# Patient Record
Sex: FEMALE | Race: WHITE | URBAN - METROPOLITAN AREA
[De-identification: names, ages, dates, MRNs, and addresses within clinical notes are randomized per-mention and may not be internally consistent; named-entity substitution may affect disease eponyms.]

---

## 2017-01-25 DIAGNOSIS — B18.2 CHRONIC HEPATITIS C WITHOUT HEPATIC COMA (H): Primary | ICD-10-CM

## 2017-01-27 ENCOUNTER — OFFICE VISIT (OUTPATIENT)
Dept: GASTROENTEROLOGY | Facility: CLINIC | Age: 57
End: 2017-01-27
Attending: PHYSICIAN ASSISTANT
Payer: COMMERCIAL

## 2017-01-27 VITALS
BODY MASS INDEX: 25.36 KG/M2 | WEIGHT: 161.6 LBS | OXYGEN SATURATION: 96 % | DIASTOLIC BLOOD PRESSURE: 73 MMHG | SYSTOLIC BLOOD PRESSURE: 119 MMHG | HEIGHT: 67 IN | HEART RATE: 92 BPM | TEMPERATURE: 97.7 F

## 2017-01-27 DIAGNOSIS — B18.2 CHRONIC HEPATITIS C WITHOUT HEPATIC COMA (H): ICD-10-CM

## 2017-01-27 DIAGNOSIS — B18.2 CHRONIC HEPATITIS C WITHOUT HEPATIC COMA (H): Primary | ICD-10-CM

## 2017-01-27 PROBLEM — E78.2 MIXED HYPERLIPIDEMIA: Status: ACTIVE | Noted: 2017-01-27

## 2017-01-27 PROBLEM — F41.9 ANXIETY: Status: ACTIVE | Noted: 2017-01-27

## 2017-01-27 LAB
ALBUMIN SERPL-MCNC: 4 G/DL (ref 3.4–5)
ALP SERPL-CCNC: 70 U/L (ref 40–150)
ALT SERPL W P-5'-P-CCNC: 45 U/L (ref 0–50)
ANION GAP SERPL CALCULATED.3IONS-SCNC: 10 MMOL/L (ref 3–14)
AST SERPL W P-5'-P-CCNC: 41 U/L (ref 0–45)
BILIRUB DIRECT SERPL-MCNC: 0.1 MG/DL (ref 0–0.2)
BILIRUB SERPL-MCNC: 0.6 MG/DL (ref 0.2–1.3)
BUN SERPL-MCNC: 17 MG/DL (ref 7–30)
CALCIUM SERPL-MCNC: 9.1 MG/DL (ref 8.5–10.1)
CHLORIDE SERPL-SCNC: 107 MMOL/L (ref 94–109)
CO2 SERPL-SCNC: 23 MMOL/L (ref 20–32)
CREAT SERPL-MCNC: 0.73 MG/DL (ref 0.52–1.04)
ERYTHROCYTE [DISTWIDTH] IN BLOOD BY AUTOMATED COUNT: 11.7 % (ref 10–15)
GFR SERPL CREATININE-BSD FRML MDRD: 83 ML/MIN/1.7M2
GLUCOSE SERPL-MCNC: 94 MG/DL (ref 70–99)
HAV IGG SER QL IA: NORMAL
HBV SURFACE AB SERPL IA-ACNC: 0.79 M[IU]/ML
HBV SURFACE AG SERPL QL IA: NONREACTIVE
HCT VFR BLD AUTO: 40.9 % (ref 35–47)
HGB BLD-MCNC: 14.3 G/DL (ref 11.7–15.7)
INR PPP: 0.99 (ref 0.86–1.14)
IRON SATN MFR SERPL: 42 % (ref 15–46)
IRON SERPL-MCNC: 134 UG/DL (ref 35–180)
MCH RBC QN AUTO: 33.5 PG (ref 26.5–33)
MCHC RBC AUTO-ENTMCNC: 35 G/DL (ref 31.5–36.5)
MCV RBC AUTO: 96 FL (ref 78–100)
PLATELET # BLD AUTO: 215 10E9/L (ref 150–450)
POTASSIUM SERPL-SCNC: 3.8 MMOL/L (ref 3.4–5.3)
PROT SERPL-MCNC: 7.8 G/DL (ref 6.8–8.8)
RBC # BLD AUTO: 4.27 10E12/L (ref 3.8–5.2)
SODIUM SERPL-SCNC: 140 MMOL/L (ref 133–144)
TIBC SERPL-MCNC: 321 UG/DL (ref 240–430)
WBC # BLD AUTO: 4.7 10E9/L (ref 4–11)

## 2017-01-27 PROCEDURE — 87902 NFCT AGT GNTYP ALYS HEP C: CPT | Performed by: PHYSICIAN ASSISTANT

## 2017-01-27 PROCEDURE — 80076 HEPATIC FUNCTION PANEL: CPT | Performed by: PHYSICIAN ASSISTANT

## 2017-01-27 PROCEDURE — 80048 BASIC METABOLIC PNL TOTAL CA: CPT | Performed by: PHYSICIAN ASSISTANT

## 2017-01-27 PROCEDURE — 83540 ASSAY OF IRON: CPT | Performed by: PHYSICIAN ASSISTANT

## 2017-01-27 PROCEDURE — 85610 PROTHROMBIN TIME: CPT | Performed by: PHYSICIAN ASSISTANT

## 2017-01-27 PROCEDURE — 87340 HEPATITIS B SURFACE AG IA: CPT | Performed by: PHYSICIAN ASSISTANT

## 2017-01-27 PROCEDURE — 85027 COMPLETE CBC AUTOMATED: CPT | Performed by: PHYSICIAN ASSISTANT

## 2017-01-27 PROCEDURE — 86704 HEP B CORE ANTIBODY TOTAL: CPT | Performed by: PHYSICIAN ASSISTANT

## 2017-01-27 PROCEDURE — 86708 HEPATITIS A ANTIBODY: CPT | Performed by: PHYSICIAN ASSISTANT

## 2017-01-27 PROCEDURE — 86706 HEP B SURFACE ANTIBODY: CPT | Performed by: PHYSICIAN ASSISTANT

## 2017-01-27 PROCEDURE — 36415 COLL VENOUS BLD VENIPUNCTURE: CPT | Performed by: PHYSICIAN ASSISTANT

## 2017-01-27 PROCEDURE — 99212 OFFICE O/P EST SF 10 MIN: CPT | Mod: ZF

## 2017-01-27 PROCEDURE — 83550 IRON BINDING TEST: CPT | Performed by: PHYSICIAN ASSISTANT

## 2017-01-27 PROCEDURE — 87522 HEPATITIS C REVRS TRNSCRPJ: CPT | Performed by: PHYSICIAN ASSISTANT

## 2017-01-27 RX ORDER — TRAZODONE HYDROCHLORIDE 50 MG/1
TABLET, FILM COATED ORAL
Refills: 0 | COMMUNITY
Start: 2017-01-13

## 2017-01-27 RX ORDER — METHOCARBAMOL 500 MG/1
TABLET, FILM COATED ORAL
COMMUNITY
Start: 2016-08-03

## 2017-01-27 ASSESSMENT — PAIN SCALES - GENERAL: PAINLEVEL: NO PAIN (0)

## 2017-01-27 NOTE — NURSING NOTE
Chief Complaint   Patient presents with     Consult     Hepatitis C   Pt roomed, vitals, meds, and allergies reviewed with pt. Pt ready for provider.  Theodore Alcantar, CMA

## 2017-01-27 NOTE — PROGRESS NOTES
Division of Gastroenterology, Hepatology and Nutrition Department of Medicine       Assessment/Plan:  Chronic Hepatitis C,  genotype pending         Lashon Leggett  has chronic hepatitis C.  She has normal liver synthetic function labs including PLTs of 215.  There is no stigmata of chronic liver disease.  Her HCV genotype, quant,  and labs for Hepatitis A and B serology are pending.  The iron saturation is 42%.  She  will schedule a Fibrosis Scan and US.   I will see the patient afterwards to discuss the results and start the Hepatitis C treatment process.     We did discuss the effects of alcohol and hepatitis C on the liver.      Thank you for allowing me to participate in the care of this patient.  If you have any questions regarding this visit and plan of care, please do not hesitate to page me at [phone].        Senia Santillan MS, PA-C  Division of Gastroenterology, Hepatology and Nutrition      HPI:    Lashon Leggett is a pleasant 56 year old  female  who presents for consultation regarding chronic hepatitis C.  She is genotype unknown. She was diagnosed just one week ago.  Her risk factors for hepatitis C are: 22 years ago had a blood transfusion. She denies any IV or intranasal drugs.    She does drink every moderately every few weeks at dinner.  She has no history of alcohol abuse.      She  is  treatment naive. This patient also has the following significant past medical history:   Patient Active Problem List   Diagnosis     CARDIOVASCULAR SCREENING; LDL GOAL LESS THAN 160     Chronic hepatitis C without hepatic coma (H)     Anxiety     Mixed hyperlipidemia   .    The patient does not have ascites or any overt signs of hepatic encephalopathy.  She  denies any hematemesis, hematochezia or melena.  She is negative for HIV, DM, inherited coagulation disorders, cryoglobulinemia, CAD, chronic skin conditions.  She did quit smoking a year ago.    There is no family history of liver  "disease.    The patient is  and has 2 adult daughters.  She has recently adopted a 3 year old.  She lives in [place] about 1-1/2 hours away.    ROS:  10 point review of systems performed.  All pertinent positives noted in the HPI,  otherwise Negative.      History reviewed. No pertinent past medical history.    Current Outpatient Prescriptions   Medication Sig Dispense Refill     naproxen (NAPROSYN) 375 MG tablet Take 375 mg by mouth       traZODone (DESYREL) 50 MG tablet   0     methocarbamol (ROBAXIN) 500 MG tablet        ALPRAZOLAM PO Take 0.25 mg by mouth 2 times daily as needed for anxiety         No Known Allergies    History reviewed. No pertinent family history.    Social History     Social History     Marital Status:      Spouse Name: N/A     Number of Children: N/A     Years of Education: N/A     Social History Main Topics     Smoking status: Former Smoker     Smokeless tobacco: Never Used     Alcohol Use: No      Comment: on occ     Drug Use: No     Sexual Activity: Not Currently     Other Topics Concern     None     Social History Narrative    ** Merged History Encounter **            OBJECTIVE  Vitals: Blood pressure 119/73, pulse 92, temperature 97.7  F (36.5  C), temperature source Oral, height 1.702 m (5' 7\"), weight 73.301 kg (161 lb 9.6 oz), SpO2 96 %. There is no weight on file to calculate BMI.  Gen: No acute distress, well nourished  Head: Normocephalic atraumatic  Eyes: Sclera anicteric  Respiratory: Clear to auscultation bilaterally, no overt wheezing or rales  CV: RRR without overt murmur  Abdomen: Soft, nontender, nondistended, normal bowel sounds  Without appreciable hepatosplenomegaly or masses  Extrem: No edema  Skin: No jaundice, spider angiomata or palmar erythema.  No rashes.   Neuro: Alert and oriented.   MSK: Grossly Intact  Psych: Normal speech, normal affect    Recent Laboratory Test Results  Lab Results   Component Value Date    ALBUMIN 4.0 01/27/2017    BILITOTAL " 0.6 01/27/2017    ALKPHOS 70 01/27/2017    AST 41 01/27/2017    ALT 45 01/27/2017    WBC 4.7 01/27/2017    ANEU 5.8 05/05/2015    HGB 14.3 01/27/2017     01/27/2017    INR 0.99 01/27/2017    CR 0.73 01/27/2017

## 2017-01-27 NOTE — MR AVS SNAPSHOT
After Visit Summary   1/27/2017    Lashon Leggett    MRN: [id number]           Patient Information     Date Of Birth          1960        Visit Information        Provider Department      1/27/2017 10:30 AM Senia Santillan PA-C [facility]        Today's Diagnoses     Chronic hepatitis C without hepatic coma (H)    -  1        Follow-ups after your visit        Your next 10 appointments already scheduled     Feb 17, 2017 11:30 AM   US ABDOMEN COMPLETE with [email or web address]   [facility] US ([facility])    [address] [id number] [phone]           Please bring a list of your medicines (including vitamins, minerals and over-the-counter drugs). Also, tell your doctor about any allergies you may have. Wear comfortable clothes and leave your valuables at home.  Adults: No eating or drinking for 8 hours before the exam. You may take medicine with a small sip of water.  Children: - Children 6+ years: No food or drink for 6 hours before exam. - Children 1-5 years: No food or drink for 4 hours before exam. - Infants, breast-fed: may have breast milk up to 2 hours before exam. - Infants, formula: may have bottle until 4 hours before exam.  Please call the Imaging Department at your exam site with any questions.            Feb 17, 2017  1:00 PM   (Arrive by 12:45 PM)   FIBROSIS SCAN with  FIBROSIS SCAN   [facility] ([facility])    [address] [place] [id number] [phone]            Feb 17, 2017  1:30 PM   (Arrive by 1:15 PM)   Return General Liver with LILLIAN [facility] ([facility])    [address] [place] [id number] [phone]              Future tests that were ordered for you today     Open Future Orders        Priority Expected Expires Ordered    Fibrosis  "Scan (In-Clinic) Routine  2018    US Abdomen Complete Routine  2018            Who to contact     If you have questions or need follow up information about today's clinic visit or your schedule please contact [facility]PAT[facility] directly at [phone].  Normal or non-critical lab and imaging results will be communicated to you by MyChart, letter or phone within 4 business days after the clinic has received the results. If you do not hear from us within 7 days, please contact the clinic through [facility] or phone. If you have a critical or abnormal lab result, we will notify you by phone as soon as possible.  Submit refill requests through [facility] or call your pharmacy and they will forward the refill request to us. Please allow 3 business days for your refill to be completed.          Additional Information About Your Visit        [employer] Information     [facility] lets you send messages to your doctor, view your test results, renew your prescriptions, schedule appointments and more. To sign up, go to [email or web address] . Click on \"Log in\" on the left side of the screen, which will take you to the Welcome page. Then click on \"Sign up Now\" on the right side of the page.     You will be asked to enter the access code listed below, as well as some personal information. Please follow the directions to create your username and password.     Your access code is: [email or web address]  Expires: 2017  6:30 AM     Your access code will  in 90 days. If you need help or a new code, please call your [facility] clinic or [phone].        Care EveryWhere ID     This is your Care EveryWhere ID. This could be used by other organizations to access your [facility] medical records  [id number]        Your Vitals Were     Pulse Temperature Height BMI (Body Mass Index) Pulse Oximetry       92 97.7  F (36.5  C) (Oral) 1.702 m (5' 7\") 25.30 kg/m2 96%        Blood Pressure from Last 3 Encounters: "   01/27/17 119/73   05/05/15 112/78   07/30/09 100/70    Weight from Last 3 Encounters:   01/27/17 73.301 kg (161 lb 9.6 oz)   05/05/15 71.215 kg (157 lb)   07/30/09 64.592 kg (142 lb 6.4 oz)               Primary Care Provider Office Phone # Fax #    Melanie AlemanIAN [facility] [phone]       [place] [address] [place] [address]        Thank you!     Thank you for choosing [facility]  for your care. Our goal is always to provide you with excellent care. Hearing back from our patients is one way we can continue to improve our services. Please take a few minutes to complete the written survey that you may receive in the mail after your visit with us. Thank you!             Your Updated Medication List - Protect others around you: Learn how to safely use, store and throw away your medicines at www.disposemymeds.org.          This list is accurate as of: 1/27/17 11:07 AM.  Always use your most recent med list.                   Brand Name Dispense Instructions for use    ALPRAZOLAM PO      Take 0.25 mg by mouth 2 times daily as needed for anxiety       methocarbamol 500 MG tablet    ROBAXIN         NAPROSYN 375 MG tablet   Generic drug:  naproxen      Take 375 mg by mouth       traZODone 50 MG tablet    DESYREL

## 2017-01-27 NOTE — Clinical Note
1/27/2017       RE: Lashon Leggett  [address] [facility] [address]     Dear Colleague,    Thank you for referring your patient, Lashon Leggett, to the [facility]PAT[facility] at [facility]. Please see a copy of my visit note below.    Division of Gastroenterology, Hepatology and Nutrition Department of Medicine       Assessment/Plan:  Chronic Hepatitis C,  genotype pending         Lashon Leggett  has chronic hepatitis C.  She has normal liver synthetic function labs including PLTs of 215.  There is no stigmata of chronic liver disease.  Her HCV genotype, quant,  and labs for Hepatitis A and B serology are pending.  The iron saturation is 42%.  She  will schedule a Fibrosis Scan and US.   I will see the patient afterwards to discuss the results and start the Hepatitis C treatment process.     We did discuss the effects of alcohol and hepatitis C on the liver.      Thank you for allowing me to participate in the care of this patient.  If you have any questions regarding this visit and plan of care, please do not hesitate to page me at [phone].        Senia Santillan MS, PA-C  Division of Gastroenterology, Hepatology and Nutrition      HPI:    Lashon Leggett is a pleasant 56 year old  female  who presents for consultation regarding chronic hepatitis C.  She is genotype unknown. She was diagnosed just one week ago.  Her risk factors for hepatitis C are: 22 years ago had a blood transfusion. She denies any IV or intranasal drugs.    She does drink every moderately every few weeks at dinner.  She has no history of alcohol abuse.      She  is  treatment naive. This patient also has the following significant past medical history:   Patient Active Problem List   Diagnosis     CARDIOVASCULAR SCREENING; LDL GOAL LESS THAN 160     Chronic hepatitis C without hepatic coma (H)     Anxiety     Mixed hyperlipidemia   .    The patient does not have ascites or any overt signs of  "hepatic encephalopathy.  She  denies any hematemesis, hematochezia or melena.  She is negative for HIV, DM, inherited coagulation disorders, cryoglobulinemia, CAD, chronic skin conditions.  She did quit smoking a year ago.    There is no family history of liver disease.    The patient is  and has 2 adult daughters.  She has recently adopted a 3 year old.  She lives in [place] about 1-1/2 hours away.    ROS:  10 point review of systems performed.  All pertinent positives noted in the HPI,  otherwise Negative.      History reviewed. No pertinent past medical history.    Current Outpatient Prescriptions   Medication Sig Dispense Refill     naproxen (NAPROSYN) 375 MG tablet Take 375 mg by mouth       traZODone (DESYREL) 50 MG tablet   0     methocarbamol (ROBAXIN) 500 MG tablet        ALPRAZOLAM PO Take 0.25 mg by mouth 2 times daily as needed for anxiety         No Known Allergies    History reviewed. No pertinent family history.    Social History     Social History     Marital Status:      Spouse Name: N/A     Number of Children: N/A     Years of Education: N/A     Social History Main Topics     Smoking status: Former Smoker     Smokeless tobacco: Never Used     Alcohol Use: No      Comment: on occ     Drug Use: No     Sexual Activity: Not Currently     Other Topics Concern     None     Social History Narrative    ** Merged History Encounter **            OBJECTIVE  Vitals: Blood pressure 119/73, pulse 92, temperature 97.7  F (36.5  C), temperature source Oral, height 1.702 m (5' 7\"), weight 73.301 kg (161 lb 9.6 oz), SpO2 96 %. There is no weight on file to calculate BMI.  Gen: No acute distress, well nourished  Head: Normocephalic atraumatic  Eyes: Sclera anicteric  Respiratory: Clear to auscultation bilaterally, no overt wheezing or rales  CV: RRR without overt murmur  Abdomen: Soft, nontender, nondistended, normal bowel sounds  Without appreciable hepatosplenomegaly or masses  Extrem: No " edema  Skin: No jaundice, spider angiomata or palmar erythema.  No rashes.   Neuro: Alert and oriented.   MSK: Grossly Intact  Psych: Normal speech, normal affect    Recent Laboratory Test Results  Lab Results   Component Value Date    ALBUMIN 4.0 01/27/2017    BILITOTAL 0.6 01/27/2017    ALKPHOS 70 01/27/2017    AST 41 01/27/2017    ALT 45 01/27/2017    WBC 4.7 01/27/2017    ANEU 5.8 05/05/2015    HGB 14.3 01/27/2017     01/27/2017    INR 0.99 01/27/2017    CR 0.73 01/27/2017       Again, thank you for allowing me to participate in the care of your patient.      Sincerely,    Senia Santillan PA-C

## 2017-01-30 LAB
HBV CORE AB SERPL QL IA: NONREACTIVE
HCV RNA SERPL NAA+PROBE-ACNC: ABNORMAL [IU]/ML
HCV RNA SERPL NAA+PROBE-LOG IU: 4.6 LOG IU/ML

## 2017-02-02 LAB — HCV GENTYP SERPL NAA+PROBE: NORMAL

## 2017-02-03 NOTE — PROGRESS NOTES
Pt is currently seeing Elvira [facility] out of [facility].  She will contact the [facility]

## 2017-02-06 ENCOUNTER — TELEPHONE (OUTPATIENT)
Dept: GASTROENTEROLOGY | Facility: CLINIC | Age: 57
End: 2017-02-06

## 2017-02-06 DIAGNOSIS — B18.2 CHRONIC HEPATITIS C WITHOUT HEPATIC COMA (H): Primary | ICD-10-CM

## 2017-02-06 NOTE — TELEPHONE ENCOUNTER
Patient will have labs redrawn (HCV RNA Quant/genotype) on 05/04/17 at her primary clinic, [facility]. Lab orders successfully faxed to [phone]. Main clinic line is [phone]. [facility] will contact the patient to schedule a lab appointment once they receive the orders. Patient will contact me if there are any issues with this. I will call the patient with a lab reminder on 05/01/17. Patient rescheduled for US/Fibrosis Scan/Senia Santillan appointment on 05/18/17. I will be sure to have lab results prior to these appointments. Patient has my direct contact information.     Ludivina Paige LPN  [facility] - Hepatitis C Program

## 2017-02-06 NOTE — TELEPHONE ENCOUNTER
Notes Recorded by Ludivina Paige LPN on 2/6/2017 at 11:07 AM  Results and recommendations per Senia Santillan discussed with patient. Patient states understanding of results and of plan to reschedule appointments for 3 months out. Message sent to schedulers to assist with this. Future orders for HCV RNA quant and genotype entered.     Ludivina Paige LPN  [facility] - Hepatitis C Program    ------    Notes Recorded by Senia Santillan PA-C on 2/3/2017 at 4:39 PM  This patient has too low of a HCV RNA quat to determine the genotype.  She is scheduled for an US, Fibrosis Scan and to see me on 2/17/17.  These should be cancelled at this time.   Please put an order in to check the genotype and HCV RNA quant in 3 months.  Please call the patient to let her know this.  Thanks,  Senia

## 2017-04-26 DIAGNOSIS — B18.2 CHRONIC HEPATITIS C WITHOUT HEPATIC COMA (H): Primary | ICD-10-CM

## 2017-05-26 ENCOUNTER — HEALTH MAINTENANCE LETTER (OUTPATIENT)
Age: 57
End: 2017-05-26

## 2018-02-23 ENCOUNTER — HOSPITAL ENCOUNTER (EMERGENCY)
Facility: CLINIC | Age: 58
Discharge: HOME | End: 2018-02-24
Attending: EMERGENCY MEDICINE | Admitting: EMERGENCY MEDICINE
Payer: COMMERCIAL

## 2018-02-23 DIAGNOSIS — G43.911 INTRACTABLE MIGRAINE WITH STATUS MIGRAINOSUS, UNSPECIFIED MIGRAINE TYPE: ICD-10-CM

## 2018-02-23 PROCEDURE — 99284 EMERGENCY DEPT VISIT MOD MDM: CPT | Mod: 25

## 2018-02-23 PROCEDURE — 25000128 H RX IP 250 OP 636: Performed by: EMERGENCY MEDICINE

## 2018-02-23 PROCEDURE — 96375 TX/PRO/DX INJ NEW DRUG ADDON: CPT

## 2018-02-23 PROCEDURE — 96374 THER/PROPH/DIAG INJ IV PUSH: CPT

## 2018-02-23 RX ORDER — DIPHENHYDRAMINE HYDROCHLORIDE 50 MG/ML
50 INJECTION INTRAMUSCULAR; INTRAVENOUS ONCE
Status: COMPLETED | OUTPATIENT
Start: 2018-02-23 | End: 2018-02-23

## 2018-02-23 RX ORDER — KETOROLAC TROMETHAMINE 15 MG/ML
15 INJECTION, SOLUTION INTRAMUSCULAR; INTRAVENOUS ONCE
Status: COMPLETED | OUTPATIENT
Start: 2018-02-23 | End: 2018-02-23

## 2018-02-23 RX ORDER — DEXAMETHASONE SODIUM PHOSPHATE 10 MG/ML
10 INJECTION, SOLUTION INTRAMUSCULAR; INTRAVENOUS ONCE
Status: COMPLETED | OUTPATIENT
Start: 2018-02-23 | End: 2018-02-23

## 2018-02-23 RX ADMIN — DIPHENHYDRAMINE HYDROCHLORIDE 50 MG: 50 INJECTION, SOLUTION INTRAMUSCULAR; INTRAVENOUS at 22:51

## 2018-02-23 RX ADMIN — DEXAMETHASONE SODIUM PHOSPHATE 10 MG: 10 INJECTION, SOLUTION INTRAMUSCULAR; INTRAVENOUS at 22:53

## 2018-02-23 RX ADMIN — PROCHLORPERAZINE EDISYLATE 10 MG: 5 INJECTION INTRAMUSCULAR; INTRAVENOUS at 22:51

## 2018-02-23 RX ADMIN — KETOROLAC TROMETHAMINE 15 MG: 15 INJECTION, SOLUTION INTRAMUSCULAR; INTRAVENOUS at 22:52

## 2018-02-23 ASSESSMENT — ENCOUNTER SYMPTOMS
HEADACHES: 1
PHOTOPHOBIA: 1
NAUSEA: 1
FEVER: 0

## 2018-02-23 NOTE — ED AVS SNAPSHOT
Emergency Department    [address] [place] [address]    Phone:  [phone]    Fax:  [phone]                                       Lashon Leggett   MRN: [id number]    Department:   Emergency Department   Date of Visit:  2/23/2018           Patient Information     Date Of Birth          1960        Your diagnoses for this visit were:     Intractable migraine with status migrainosus, unspecified migraine type        You were seen by Navarro Dunn MD.      Follow-up Information     Call in 1 day to follow up.    Why:  with your headache specialist, If symptoms return        Discharge Instructions         Migraine Headache  Migraine headaches are related to changes in blood flow to the brain. This causes throbbing or constant pain on one or both sides of the head. The pain may last from a few hours to several days. There is usually nausea, vomiting, sensitivity to light and sound, and blurred vision. A migraine attack may be triggered by emotional stress, hormone changes during the menstrual cycle, oral contraceptives, alcohol use, certain foods containing tyramine, eye strain, weather changes, missing meals, or too little or too much sleep.  Home Care For This Headache:  1) If you were given pain medicine for this headache, do not drive yourself home . Arrange for a ride, instead. When you get home, try to sleep. You should feel much better when you wake up.  2) Migraine headaches may improve with an ice pack on the forehead or at the base of the skull. Heat to the back of your neck may relieve any neck spasm.  3) Drink only clear liquids or eat a very light diet to avoid nausea/vomiting until symptoms improve.  Preventing Future Headaches:  1) Pay attention to those factors that seem to trigger your headache. Try to avoid them when you can. If you have frequent headaches, it is useful to keep a diary of what you were doing, feeling or eating in the hours before each attack.  Show this to your doctor to help find the cause of your headaches.  a) If you feel that stress is a factor in your headaches, look at the sources of stress in your life. Find ways to release the build-up of those stresses by using regular exercise, relaxation methods (yoga, meditation), bio-feedback or simply taking time-out for yourself. For more information about this, consult your doctor or go to a local bookstore and review books and tapes on this subject.  b) Tyramine is a substance present in the following foods : chocolate, yogurt, all cheeses except cottage cheese and cream cheese. smoked or pickled fish and meat (including herring, caviar, bologna, pepperoni, salami), liver, avocados, bananas, figs, raisins, and red wine. Be aware that these foods may trigger a migraine in some persons. Try taking these foods out of your diet for 1-2 months to see if this reduces headache frequency.  Treating Future Attacks:  1) At the first sign of a migraine headache, take a medicine to stop it if one has been prescribed for you. If not, take acetaminophen (Tylenol) or ibuprofen (Motrin, Advil) if you are able to take these. The sooner you take medicine, the better it will work.  2) You may also want to find a quiet, dark, comfortable place to sit or lie down. Let yourself relax or sleep.  3) An ice pack on the forehead or area of greatest pain may also help.   Follow Up  with your doctor if the headache is not better within the next 24 hours. If you have frequent headaches you should discuss a treatment plan with your primary care doctor. Ask if you can have medicine to take at home the next time you get a bad headache. Poorly controlled chronic headaches may require a referral to a neurologist (headache specialist).  Get Prompt Medical Attention  if any of the following occur:    Your head pain gets worse, or does not improve within 24 hours    Repeated vomiting (can t keep liquids down)    Sinus or ear or throat pain  (not already reported)    Fever of 101  F (38.3  C) or higher, or as directed by your healthcare provider    Stiff neck    Extreme drowsiness, confusion or fainting    Weakness of an arm or leg or one side of the face    Difficulty with speech or vision    6508-5274 [employer]. [address] [place] [address]. All rights reserved. This information is not intended as a substitute for professional medical care. Always follow your healthcare professional's instructions.  This information has been modified by your health care provider with permission from the publisher.          24 Hour Appointment Hotline       To make an appointment at any [facility], call [phone] ([phone]). If you don't have a family doctor or clinic, we will help you find one. [facility] clinics are conveniently located to serve the needs of you and your family.             Review of your medicines      Our records show that you are taking the medicines listed below. If these are incorrect, please call your family doctor or clinic.        Dose / Directions Last dose taken    ALPRAZOLAM PO   Dose:  0.25 mg        Take 0.25 mg by mouth 2 times daily as needed for anxiety   Refills:  0        methocarbamol 500 MG tablet   Commonly known as:  ROBAXIN        Refills:  0        NAPROSYN 375 MG tablet   Dose:  375 mg   Generic drug:  naproxen        Take 375 mg by mouth   Refills:  0        traZODone 50 MG tablet   Commonly known as:  DESYREL        Refills:  0                Orders Needing Specimen Collection     None      Pending Results     No orders found for last 3 day(s).            Pending Culture Results     No orders found for last 3 day(s).            Pending Results Instructions     If you had any lab results that were not finalized at the time of your Discharge, you can call the ED Lab Result RN at [phone]. You will be contacted by this team for any positive Lab results or changes in treatment. The  nurses are available 7 days a week from 10A to 6:30P.  You can leave a message 24 hours per day and they will return your call.        Test Results From Your Hospital Stay               Clinical Quality Measure: Blood Pressure Screening     Your blood pressure was checked while you were in the emergency department today. The last reading we obtained was  BP: 107/63 . Please read the guidelines below about what these numbers mean and what you should do about them.  If your systolic blood pressure (the top number) is less than 120 and your diastolic blood pressure (the bottom number) is less than 80, then your blood pressure is normal. There is nothing more that you need to do about it.  If your systolic blood pressure (the top number) is 120-139 or your diastolic blood pressure (the bottom number) is 80-89, your blood pressure may be higher than it should be. You should have your blood pressure rechecked within a year by a primary care provider.  If your systolic blood pressure (the top number) is 140 or greater or your diastolic blood pressure (the bottom number) is 90 or greater, you may have high blood pressure. High blood pressure is treatable, but if left untreated over time it can put you at risk for heart attack, stroke, or kidney failure. You should have your blood pressure rechecked by a primary care provider within the next 4 weeks.  If your provider in the emergency department today gave you specific instructions to follow-up with your doctor or provider even sooner than that, you should follow that instruction and not wait for up to 4 weeks for your follow-up visit.        Thank you for choosing [facility]       Thank you for choosing [facility] for your care. Our goal is always to provide you with excellent care. Hearing back from our patients is one way we can continue to improve our services. Please take a few minutes to complete the written survey that you may receive in the mail after you visit with us.  "Thank you!        [employer] Information     [employer] lets you send messages to your doctor, view your test results, renew your prescriptions, schedule appointments and more. To sign up, go to [email or web address] . Click on \"Log in\" on the left side of the screen, which will take you to the Welcome page. Then click on \"Sign up Now\" on the right side of the page.     You will be asked to enter the access code listed below, as well as some personal information. Please follow the directions to create your username and password.     Your access code is: [email or web address]  Expires: 2018 12:11 AM     Your access code will  in 90 days. If you need help or a new code, please call your [facility] clinic or [phone].        Care EveryWhere ID     This is your Care EveryWhere ID. This could be used by other organizations to access your [facility] medical records  [id number]        Equal Access to Services     [facility] : Hadii penny babino Sotanna, waaxda luednaadaha, qaybta kaalmada adegenesis, lisette rosario . So [facility] [phone].    ATENCIÓN: Si habla español, tiene a vazquez disposición servicios gratuitos de asistencia lingüística. Llame al [phone].    We comply with applicable federal civil rights laws and [place] laws. We do not discriminate on the basis of race, color, national origin, age, disability, sex, sexual orientation, or gender identity.            After Visit Summary       This is your record. Keep this with you and show to your community pharmacist(s) and doctor(s) at your next visit.                  "

## 2018-02-23 NOTE — ED AVS SNAPSHOT
Emergency Department    [address] [place] [address]    Phone:  [phone]    Fax:  [phone]                                       Lashon Leggett   MRN: [id number]    Department:   Emergency Department   Date of Visit:  2/23/2018           After Visit Summary Signature Page     I have received my discharge instructions, and my questions have been answered. I have discussed any challenges I see with this plan with the nurse or doctor.    ..........................................................................................................................................  Patient/Patient Representative Signature      ..........................................................................................................................................  Patient Representative Print Name and Relationship to Patient    ..................................................               ................................................  Date                                            Time    ..........................................................................................................................................  Reviewed by Signature/Title    ...................................................              ..............................................  Date                                                            Time

## 2018-02-24 VITALS
TEMPERATURE: 98.6 F | BODY MASS INDEX: 26.94 KG/M2 | WEIGHT: 172 LBS | OXYGEN SATURATION: 92 % | SYSTOLIC BLOOD PRESSURE: 107 MMHG | DIASTOLIC BLOOD PRESSURE: 63 MMHG

## 2018-02-24 NOTE — ED PROVIDER NOTES
History     Chief Complaint:  Headache      HPI   Lashon Leggett is a 57 year old female with a history of migraines who presents to the emergency department today for evaluation of headache. 5 days ago, the patient developed a migraine type headache. She has used Sumatriptan with no change in symptoms prompting ED visit. Here, she notes she has associated photophobia, phonophobia, and nausea. Her headache today is consistent with previous episodes of headache. No head injury. No fever. No other concerns voiced at this time.     Allergies:  No Known Drug Allergies     Medications:    naproxen (NAPROSYN) 375 MG tablet  traZODone (DESYREL) 50 MG tablet  methocarbamol (ROBAXIN) 500 MG tablet  ALPRAZOLAM PO     Past Medical History:    Migraine     Past Surgical History:    Hysterectomy     Family History:    History reviewed. No pertinent family history.     Social History:  The patient was accompanied to the ED by daughter.  Smoking Status: Former smoker  Smokeless Tobacco: Never used  Alcohol Use: No  Marital Status:   [2]     Review of Systems   Constitutional: Negative for fever.   Eyes: Positive for photophobia.   Gastrointestinal: Positive for nausea.   Neurological: Positive for headaches.   All other systems reviewed and are negative.    Physical Exam   First Vitals:  BP: 122/84  Heart Rate: 94  Temp: 98.6  F (37  C)  Weight: 78 kg (172 lb)  SpO2: 98 %    Physical Exam  General: Alert and Interactive.   Head: No signs of trauma.   Mouth/Throat: Oropharynx is clear and moist.   Eyes: Conjunctivae are normal. Pupils are equal, round, and reactive to light.   Neck: Normal range of motion. No nuchal rigidity.   CV: Normal rate and regular rhythm.    Resp: Effort normal and breath sounds normal. No respiratory distress.   GI: Soft. There is no tenderness or guarding.   MSK: Normal range of motion. no edema.   Neuro: The patient is alert and oriented to person, place, and time.  PERRLA, EOMI, strength in  upper/lower extremities normal and symmetrical.   Sensation normal. Speech normal.  GCS eye subscore is 4. GCS verbal subscore is 5. GCS motor subscore is 6.   Skin: Skin is warm and dry. No rash noted.   Psych: normal mood and affect. behavior is normal.       Emergency Department Course   Interventions:  2251- Benadryl 50 mg IV  2251- Compazine 10 mg IV  2252- Toradol 15 mg IV  2253- Decadron 10 mg IV      Emergency Department Course:  Nursing notes and vitals reviewed.  I performed an exam of the patient as documented above.     At 0005 the patient was rechecked and her headache has improved. Patient is requesting to be discharged.     I discussed the treatment plan with the patient. They expressed understanding of this plan and consented to discharge.     Impression & Plan      Medical Decision Making:  The patient presented with a headache consistent with her normal migraine.  Evaluation in the emergency department has been negative. The patient has not had any fever, weakness, numbness, paresthesia, neck stiffness or confusion. Meningitis, subarachnoid hemorrhage, CNS tumor, and stroke were considered as part of the differential, and considered unlikely. The pain has improved with medication interventions.  The patient has been instructed to follow-up with her primary physician within 3 days and to return to the ED with new or worse symptoms, such as worsening headache, fever, vomiting, focal neurologic symptoms.       Plan:  1. Return to the ED with new or worse symptoms as outlined in DC instructions  2. Follow up with your PMD in 2 days for ongoing evaluation and management if any residual symptoms otherwise within 1 week for follow up.   3. Provided with standard [facility] Discharge instructions for Migraine Headache.       Diagnosis:    ICD-10-CM    1. Intractable migraine with status migrainosus, unspecified migraine type G43.911          Disposition:   Findings and plan explained to the Patient. Patient  discharged home with instructions regarding supportive care, medications, and reasons to return. The importance of close follow-up was reviewed.     Scribe Disclosure:  I, Vonda Cobos, am serving as a scribe at 10:07 PM on 2/23/2018 to document services personally performed by Navarro Dunn MD, based on my observations and the provider's statements to me.    2/23/2018    EMERGENCY DEPARTMENT       Navarro Dunn MD  02/24/18 1292

## 2018-02-24 NOTE — DISCHARGE INSTRUCTIONS
Migraine Headache  Migraine headaches are related to changes in blood flow to the brain. This causes throbbing or constant pain on one or both sides of the head. The pain may last from a few hours to several days. There is usually nausea, vomiting, sensitivity to light and sound, and blurred vision. A migraine attack may be triggered by emotional stress, hormone changes during the menstrual cycle, oral contraceptives, alcohol use, certain foods containing tyramine, eye strain, weather changes, missing meals, or too little or too much sleep.  Home Care For This Headache:  1) If you were given pain medicine for this headache, do not drive yourself home . Arrange for a ride, instead. When you get home, try to sleep. You should feel much better when you wake up.  2) Migraine headaches may improve with an ice pack on the forehead or at the base of the skull. Heat to the back of your neck may relieve any neck spasm.  3) Drink only clear liquids or eat a very light diet to avoid nausea/vomiting until symptoms improve.  Preventing Future Headaches:  1) Pay attention to those factors that seem to trigger your headache. Try to avoid them when you can. If you have frequent headaches, it is useful to keep a diary of what you were doing, feeling or eating in the hours before each attack. Show this to your doctor to help find the cause of your headaches.  a) If you feel that stress is a factor in your headaches, look at the sources of stress in your life. Find ways to release the build-up of those stresses by using regular exercise, relaxation methods (yoga, meditation), bio-feedback or simply taking time-out for yourself. For more information about this, consult your doctor or go to a local bookstore and review books and tapes on this subject.  b) Tyramine is a substance present in the following foods : chocolate, yogurt, all cheeses except cottage cheese and cream cheese. smoked or pickled fish and meat (including herring,  caviar, bologna, pepperoni, salami), liver, avocados, bananas, figs, raisins, and red wine. Be aware that these foods may trigger a migraine in some persons. Try taking these foods out of your diet for 1-2 months to see if this reduces headache frequency.  Treating Future Attacks:  1) At the first sign of a migraine headache, take a medicine to stop it if one has been prescribed for you. If not, take acetaminophen (Tylenol) or ibuprofen (Motrin, Advil) if you are able to take these. The sooner you take medicine, the better it will work.  2) You may also want to find a quiet, dark, comfortable place to sit or lie down. Let yourself relax or sleep.  3) An ice pack on the forehead or area of greatest pain may also help.   Follow Up  with your doctor if the headache is not better within the next 24 hours. If you have frequent headaches you should discuss a treatment plan with your primary care doctor. Ask if you can have medicine to take at home the next time you get a bad headache. Poorly controlled chronic headaches may require a referral to a neurologist (headache specialist).  Get Prompt Medical Attention  if any of the following occur:    Your head pain gets worse, or does not improve within 24 hours    Repeated vomiting (can t keep liquids down)    Sinus or ear or throat pain (not already reported)    Fever of 101  F (38.3  C) or higher, or as directed by your healthcare provider    Stiff neck    Extreme drowsiness, confusion or fainting    Weakness of an arm or leg or one side of the face    Difficulty with speech or vision    1868-1231 [employer]. [address] [place] [address], [place] [address]. All rights reserved. This information is not intended as a substitute for professional medical care. Always follow your healthcare professional's instructions.  This information has been modified by your health care provider with permission from the publisher.